# Patient Record
Sex: MALE | Race: WHITE | NOT HISPANIC OR LATINO | Employment: OTHER | URBAN - METROPOLITAN AREA
[De-identification: names, ages, dates, MRNs, and addresses within clinical notes are randomized per-mention and may not be internally consistent; named-entity substitution may affect disease eponyms.]

---

## 2023-01-01 ENCOUNTER — TELEMEDICINE (OUTPATIENT)
Dept: PHARMACY | Facility: HOSPITAL | Age: 58
End: 2023-01-01

## 2023-01-01 DIAGNOSIS — E10.21 DIABETIC NEPHROPATHY ASSOCIATED WITH TYPE 1 DIABETES MELLITUS (MULTI): ICD-10-CM

## 2023-01-01 DIAGNOSIS — I50.9 HEART FAILURE, UNSPECIFIED HF CHRONICITY, UNSPECIFIED HEART FAILURE TYPE (MULTI): Primary | ICD-10-CM

## 2023-01-01 DIAGNOSIS — I50.9 HEART FAILURE, UNSPECIFIED HF CHRONICITY, UNSPECIFIED HEART FAILURE TYPE (MULTI): ICD-10-CM

## 2023-01-01 DIAGNOSIS — I50.22 CHF (CONGESTIVE HEART FAILURE), NYHA CLASS I, CHRONIC, SYSTOLIC (MULTI): Primary | ICD-10-CM

## 2023-04-27 PROBLEM — I50.22: Status: ACTIVE | Noted: 2023-01-01

## 2023-04-27 NOTE — ASSESSMENT & PLAN NOTE
Patient is currently not controlled based on heart failure symptoms of lower extremity swelling and weight gain. Patient is currently wheelchair bound and is unable to exercise as normal. Based on labs, the patient is ineligible for spironolactone or Jardiance.     -RECOMMEND follow-up with cardiologist for further recommendations

## 2023-04-27 NOTE — PROGRESS NOTES
"Pharmacy Post-Discharge Visit  Marlon Flores is a 57 y.o. male was referred to Clinical Pharmacy Team to complete a post-discharge medication optimization and monitoring visit.  The patient was referred for their Congestive Heart Failure.    Referring Provider: Marlon Deal MD    Subjective   No Known Allergies  Preferred Pharmacy    [id number] - [place], [place] - [address]  [address] [address] [facility] [address]  Phone: [phone] Fax: [phone]      [facility]  CHF Assessment    Symptom/Staging:  -Most recent ejection fraction: 40-45%  -NYHA Stage: Not available   -ABCD Stage: Not available  -Weight changes?: Yes, describe: patient stated that he has gain 70 lbs. He has not been able to exercise due to being in a wheelchair \"all day long.\" His legs are swollen during the current visit.  --Patient reports heart failure symptoms such as, weight gain, and lower extremity swelling.    Guideline-Directed Medical Therapy:  -ARNI: No   -If no, then ACEi/ARB?: Yes, describe: Losartan 50 mg; Take 1 tablet by mouth daily  -Beta Blocker: Yes, describe: Carvedilol 25 mg; Take 1 tablet twice a day ; Patient is having a hard time getting this medication. Patient stated that he was told that \"it has something to do with his insurance\"  -MRA: No  -SGLT2i: No    Secondary Prevention:  -The ASCVD Risk score (Uzair FONTANEZ, et al., 2019) failed to calculate for the following reasons:    The patient has a prior MI or stroke diagnosis   -Aspirin 81mg? yes  -Statin?: Yes, describe: atorvastatin 40 mg; Take 1 tablet by mouth once daily  -HTN?: Yes, describe: losartan 50 mg; Take 1 tablet once daily      Review of Systems    Objective     There were no vitals taken for this visit.     LAB  Lab Results   Component Value Date    BILITOT 0.4 03/31/2023    CALCIUM 7.0 (L) 04/05/2023    CO2 26 04/05/2023    CL 94 (L) 04/05/2023    CREATININE 5.04 (H) 04/05/2023    GLUCOSE 207 (H) 04/05/2023    ALKPHOS 412 (H) 03/31/2023    K " 4.1 04/05/2023    PROT 7.1 03/31/2023     (L) 04/05/2023    AST 39 03/31/2023    ALT 69 (H) 03/31/2023    BUN 43 (H) 04/05/2023    ANIONGAP 16 04/05/2023    MG 1.90 01/30/2023    PHOS 5.5 (H) 04/05/2023    ALBUMIN 2.8 (L) 04/05/2023    AMYLASE 92 09/18/2020    GFRF CANCELED 04/04/2023    GFRMALE 13 (A) 04/05/2023     Lab Results   Component Value Date    TRIG 82 12/02/2022    CHOL 88 12/02/2022    HDL 43.1 12/02/2022     Lab Results   Component Value Date    HGBA1C 8.4 (A) 12/02/2022         Current Outpatient Medications on File Prior to Visit   Medication Sig Dispense Refill    acetaminophen (Tylenol) 325 mg tablet Take 500 mg by mouth every 6 hours if needed for mild pain (1 - 3).      aspirin 81 mg EC tablet Take 1 tablet (81 mg) by mouth once daily.      atorvastatin (Lipitor) 40 mg tablet Take 1 tablet (40 mg) by mouth once daily.      calcium acetate (Phoslo) 667 mg capsule Take 2 capsules (1,334 mg) by mouth 3 times a day with meals.      carvedilol (Coreg) 25 mg tablet Take 1 tablet (25 mg) by mouth 2 times a day with meals.      cholecalciferol (Vitamin D-3) 25 MCG (1000 UT) capsule Take 1 capsule (25 mcg) by mouth once daily.      losartan (Cozaar) 50 mg tablet Take 1 tablet (50 mg) by mouth once daily.      tamsulosin (Flomax) 0.4 mg 24 hr capsule Take 1 capsule (0.4 mg) by mouth once daily.      torsemide (Demadex) 20 mg tablet Take 3 tablets (60 mg) by mouth 2 times a day.      amLODIPine (Norvasc) 5 mg tablet Take 1 tablet (5 mg) by mouth once daily.       No current facility-administered medications on file prior to visit.        Assessment/Plan   Problem List Items Addressed This Visit          Circulatory    CHF (congestive heart failure), NYHA class I, chronic, systolic (CMS/[facility]) - Primary     Patient is currently not controlled based on heart failure symptoms of lower extremity swelling and weight gain. Patient is currently wheelchair bound and is unable to exercise as normal. Based on  labs, the patient is ineligible for spironolactone or Jardiance.     -RECOMMEND follow-up with cardiologist for further recommendations              Lisa Badillo, Kay     Verbal consent to manage patient's drug therapy was obtained from [the patient and/or an individual authorized to act on behalf of a patient]. They were informed they may decline to participate or withdraw from participation in pharmacy services at any time.